# Patient Record
Sex: MALE | Race: WHITE | Employment: OTHER | URBAN - METROPOLITAN AREA
[De-identification: names, ages, dates, MRNs, and addresses within clinical notes are randomized per-mention and may not be internally consistent; named-entity substitution may affect disease eponyms.]

---

## 2017-09-26 ENCOUNTER — TRANSFERRED RECORDS (OUTPATIENT)
Dept: HEALTH INFORMATION MANAGEMENT | Facility: CLINIC | Age: 82
End: 2017-09-26

## 2018-08-14 ENCOUNTER — TRANSFERRED RECORDS (OUTPATIENT)
Dept: HEALTH INFORMATION MANAGEMENT | Facility: CLINIC | Age: 83
End: 2018-08-14

## 2018-11-28 ENCOUNTER — HOSPITAL ENCOUNTER (EMERGENCY)
Facility: CLINIC | Age: 83
Discharge: HOME | End: 2018-11-28
Attending: EMERGENCY MEDICINE | Admitting: EMERGENCY MEDICINE
Payer: COMMERCIAL

## 2018-11-28 ENCOUNTER — APPOINTMENT (OUTPATIENT)
Dept: GENERAL RADIOLOGY | Facility: CLINIC | Age: 83
End: 2018-11-28
Attending: EMERGENCY MEDICINE
Payer: COMMERCIAL

## 2018-11-28 VITALS — TEMPERATURE: 97.7 F | OXYGEN SATURATION: 96 % | DIASTOLIC BLOOD PRESSURE: 84 MMHG | SYSTOLIC BLOOD PRESSURE: 163 MMHG

## 2018-11-28 DIAGNOSIS — I10 ASYMPTOMATIC HYPERTENSION: ICD-10-CM

## 2018-11-28 LAB
ANION GAP SERPL CALCULATED.3IONS-SCNC: 5 MMOL/L (ref 3–14)
BASOPHILS # BLD AUTO: 0 10E9/L (ref 0–0.2)
BASOPHILS NFR BLD AUTO: 0 %
BUN SERPL-MCNC: 15 MG/DL (ref 7–30)
CALCIUM SERPL-MCNC: 8.7 MG/DL (ref 8.5–10.1)
CHLORIDE SERPL-SCNC: 107 MMOL/L (ref 94–109)
CO2 SERPL-SCNC: 30 MMOL/L (ref 20–32)
CREAT SERPL-MCNC: 1.08 MG/DL (ref 0.66–1.25)
DIFFERENTIAL METHOD BLD: ABNORMAL
EOSINOPHIL # BLD AUTO: 0 10E9/L (ref 0–0.7)
EOSINOPHIL NFR BLD AUTO: 0 %
ERYTHROCYTE [DISTWIDTH] IN BLOOD BY AUTOMATED COUNT: 15.4 % (ref 10–15)
GFR SERPL CREATININE-BSD FRML MDRD: 64 ML/MIN/1.7M2
GLUCOSE SERPL-MCNC: 137 MG/DL (ref 70–99)
HCT VFR BLD AUTO: 47.2 % (ref 40–53)
HGB BLD-MCNC: 14.8 G/DL (ref 13.3–17.7)
LYMPHOCYTES # BLD AUTO: 2.1 10E9/L (ref 0.8–5.3)
LYMPHOCYTES NFR BLD AUTO: 16 %
MCH RBC QN AUTO: 29.7 PG (ref 26.5–33)
MCHC RBC AUTO-ENTMCNC: 31.4 G/DL (ref 31.5–36.5)
MCV RBC AUTO: 95 FL (ref 78–100)
MONOCYTES # BLD AUTO: 0.4 10E9/L (ref 0–1.3)
MONOCYTES NFR BLD AUTO: 3 %
NEUTROPHILS # BLD AUTO: 10.7 10E9/L (ref 1.6–8.3)
NEUTROPHILS NFR BLD AUTO: 81 %
NT-PROBNP SERPL-MCNC: 256 PG/ML (ref 0–1800)
PLATELET # BLD AUTO: 454 10E9/L (ref 150–450)
PLATELET # BLD EST: ABNORMAL 10*3/UL
POTASSIUM SERPL-SCNC: 4.1 MMOL/L (ref 3.4–5.3)
RBC # BLD AUTO: 4.99 10E12/L (ref 4.4–5.9)
RBC MORPH BLD: ABNORMAL
SODIUM SERPL-SCNC: 142 MMOL/L (ref 133–144)
TROPONIN I SERPL-MCNC: <0.015 UG/L (ref 0–0.04)
VARIANT LYMPHS BLD QL SMEAR: PRESENT
WBC # BLD AUTO: 13.2 10E9/L (ref 4–11)

## 2018-11-28 PROCEDURE — 36415 COLL VENOUS BLD VENIPUNCTURE: CPT | Performed by: EMERGENCY MEDICINE

## 2018-11-28 PROCEDURE — 85025 COMPLETE CBC W/AUTO DIFF WBC: CPT | Performed by: EMERGENCY MEDICINE

## 2018-11-28 PROCEDURE — 71046 X-RAY EXAM CHEST 2 VIEWS: CPT

## 2018-11-28 PROCEDURE — 83880 ASSAY OF NATRIURETIC PEPTIDE: CPT | Performed by: EMERGENCY MEDICINE

## 2018-11-28 PROCEDURE — 84484 ASSAY OF TROPONIN QUANT: CPT | Performed by: EMERGENCY MEDICINE

## 2018-11-28 PROCEDURE — 93005 ELECTROCARDIOGRAM TRACING: CPT

## 2018-11-28 PROCEDURE — 80048 BASIC METABOLIC PNL TOTAL CA: CPT | Performed by: EMERGENCY MEDICINE

## 2018-11-28 PROCEDURE — 99285 EMERGENCY DEPT VISIT HI MDM: CPT | Mod: 25

## 2018-11-28 ASSESSMENT — ENCOUNTER SYMPTOMS
DIARRHEA: 0
NAUSEA: 0
VOMITING: 0
SHORTNESS OF BREATH: 0

## 2018-11-28 NOTE — ED TRIAGE NOTES
Pt presents to ED via EMS after a routine check-up from his home health nurse who noted his heart rate 100, /100.  Nurse then called on-call physician who requested pt to be seen here.  Pt is asymptomatic.  ABCs intact, alert and orientated.

## 2018-11-28 NOTE — ED NOTES
Bed: ED22  Expected date: 11/28/18  Expected time: 5:00 PM  Means of arrival: Ambulance  Comments:  OFE

## 2018-11-28 NOTE — ED AVS SNAPSHOT
[facility] Emergency Department    [address] [place] [address]    Phone:  [phone]    Fax:  [phone]                                       Joel Brooks   MRN: [id number]    Department:  [facility] Emergency Department   Date of Visit:  11/28/2018           Patient Information     Date Of Birth          8/14/1926        Your diagnoses for this visit were:     Asymptomatic hypertension        You were seen by Kandy Thompson MD.      Follow-up Information     Follow up with [facility] [place]. Schedule an appointment as soon as possible for a visit in 2 days.    Contact information:    [facility]  [phone]          Discharge Instructions       Please have your PCP follow up with your in the next 2-3 days for repeat blood pressure check.  If you have chest pain, shortness of breath, increased swelling, difficulty doing your normal activities or other concerns return to the ED sooner.      Discharge Instructions  Hypertension - High Blood Pressure    During you visit to the Emergency Department, your blood pressure was higher than the recommended blood pressure.  This may be related to stress, pain, medication or other temporary conditions. In these cases, your blood pressure may return to normal on its own. If you have a history of high blood pressure, you may need to have your provider adjust your medications. Sometimes, your high measurement here may indicate that you have developed high blood pressure that will stay high unless it is treated. As a general rule, high blood pressure causes problems over years rather than days, weeks, or months. So, while it is important to treat blood pressure, it is rarely important to treat blood pressure immediately. Occasionally we will begin a medication in the Emergency Department; more often we will recommend close follow-up for medications with a primary doctor/clinic.    Generally, every Emergency  Department visit should have a follow-up clinic visit with either a primary or a specialty clinic/provider. Please follow-up as instructed by your emergency provider today.    Return to the Emergency Department if you start to have:    A severe headache.    Chest pain.    Shortness of breath.    Weakness or numbness that affects one part of the body.    Confusion.    Vision changes.    Significant swelling of legs and/or eyes.    A reaction to any medication started in the Emergency Department.    What can I do to help myself?    Avoid alcohol.    Take any blood pressure medicine that you are prescribed.    Get a good night s sleep.    Lower your salt intake.    Exercise.    Lose weight.    Manage stress.    See your doctor regularly    If blood pressure medication was started in the Emergency Department:    The medicine may not have an immediate effect. The body and brain determine what blood pressure you have. The medicine s job is to retrain the body s  thermostat  to a lower blood pressure.    You will need to follow up with your provider to see how this medicine is working for you.  If you were given a prescription for medicine here today, be sure to read all of the information (including the package insert) that comes with your prescription.  This will include important information about the medicine, its side effects, and any warnings that you need to know about.  The pharmacist who fills the prescription can provide more information and answer questions you may have about the medicine.  If you have questions or concerns that the pharmacist cannot address, please call or return to the Emergency Department.   Remember that you can always come back to the Emergency Department if you are not able to see your regular provider in the amount of time listed above, if you get any new symptoms, or if there is anything that worries you.      24 Hour Appointment Hotline       To make an appointment at any [facility]  clinic, call [phone] ([phone]). If you don't have a family doctor or clinic, we will help you find one. [facility] are conveniently located to serve the needs of you and your family.             Review of your medicines      Our records show that you are taking the medicines listed below. If these are incorrect, please call your family doctor or clinic.        Dose / Directions Last dose taken    aspirin 81 MG tablet   Commonly known as:  ASA        1 TABLET DAILY   Refills:  0        lisinopril-hydrochlorothiazide 10-12.5 MG per tablet   Commonly known as:  PRINZIDE/ZESTORETIC   Dose:  1 tablet        Take 1 tablet by mouth   Refills:  0        lovastatin 20 MG tablet   Commonly known as:  MEVACOR   Dose:  1 tablet        Take 1 tablet by mouth   Refills:  0        PREDNISONE PO        Refills:  0                Procedures and tests performed during your visit     Basic metabolic panel    CBC with platelets differential    EKG 12-lead, tracing only    Nt probnp inpatient (BNP)    Troponin I    XR Chest 2 Views      Orders Needing Specimen Collection     None      Pending Results     Date and Time Order Name Status Description    11/28/2018 1847 XR Chest 2 Views Preliminary     11/28/2018 1847 EKG 12-lead, tracing only Preliminary             Pending Culture Results     No orders found from 11/26/2018 to 11/29/2018.            Pending Results Instructions     If you had any lab results that were not finalized at the time of your Discharge, you can call the ED Lab Result RN at [phone]. You will be contacted by this team for any positive Lab results or changes in treatment. The nurses are available 7 days a week from 10A to 6:30P.  You can leave a message 24 hours per day and they will return your call.        Test Results From Your Hospital Stay        11/28/2018  7:36 PM      Narrative     XR CHEST 2 VW   11/28/2018 7:29 PM     HISTORY: chest congestion;     COMPARISON: None.    FINDINGS:  The heart is negative.  The lungs are clear. There is a  large gas-filled structure in the retrocardiac region. This is  consistent with a large hiatal hernia. A loop of bowel appears to  extend into the hernia sac..  Healed left rib fractures are noted..        Impression     IMPRESSION:   1. No active alveolar-type infiltrates are identified.  2. Large hiatal hernia.             11/28/2018  7:49 PM      Component Results     Component Value Ref Range & Units Status    Sodium 142 133 - 144 mmol/L Final    Potassium 4.1 3.4 - 5.3 mmol/L Final    Chloride 107 94 - 109 mmol/L Final    Carbon Dioxide 30 20 - 32 mmol/L Final    Anion Gap 5 3 - 14 mmol/L Final    Glucose 137 (H) 70 - 99 mg/dL Final    Urea Nitrogen 15 7 - 30 mg/dL Final    Creatinine 1.08 0.66 - 1.25 mg/dL Final    GFR Estimate 64 >60 mL/min/1.7m2 Final    Non  GFR Calc    GFR Estimate If Black 77 >60 mL/min/1.7m2 Final    African American GFR Calc    Calcium 8.7 8.5 - 10.1 mg/dL Final         11/28/2018  8:02 PM      Component Results     Component Value Ref Range & Units Status    WBC 13.2 (H) 4.0 - 11.0 10e9/L Final    RBC Count 4.99 4.4 - 5.9 10e12/L Final    Hemoglobin 14.8 13.3 - 17.7 g/dL Final    Hematocrit 47.2 40.0 - 53.0 % Final    MCV 95 78 - 100 fl Final    MCH 29.7 26.5 - 33.0 pg Final    MCHC 31.4 (L) 31.5 - 36.5 g/dL Final    RDW 15.4 (H) 10.0 - 15.0 % Final    Platelet Count 454 (H) 150 - 450 10e9/L Final    Diff Method Manual Differential  Final    % Neutrophils 81.0 % Final    % Lymphocytes 16.0 % Final    % Monocytes 3.0 % Final    % Eosinophils 0.0 % Final    % Basophils 0.0 % Final    Absolute Neutrophil 10.7 (H) 1.6 - 8.3 10e9/L Final    Absolute Lymphocytes 2.1 0.8 - 5.3 10e9/L Final    Absolute Monocytes 0.4 0.0 - 1.3 10e9/L Final    Absolute Eosinophils 0.0 0.0 - 0.7 10e9/L Final    Absolute Basophils 0.0 0.0 - 0.2 10e9/L Final    RBC Morphology   Final    Consistent with reported results    Platelet Estimate    Final    Automated count confirmed.  Platelet morphology is normal.    Reactive Lymphs Present  Final         11/28/2018  7:49 PM      Component Results     Component Value Ref Range & Units Status    N-Terminal Pro BNP Inpatient 256 0 - 1800 pg/mL Final       Reference range shown and results flagged as abnormal are suggested inpatient   cut points for confirming diagnosis if CHF in an acute setting. Establishing a   baseline value for each individual patient is useful for follow-up. An   inpatient or emergency department NT-proPBNP <300 pg/mL effectively rules out   acute CHF, with 99% negative predictive value.  The outpatient non-acute reference range for ruling out CHF is:   0-125 pg/mL (age 18 to less than 75)   0-450 pg/mL (age 75 yrs and older)           11/28/2018  7:49 PM      Component Results     Component Value Ref Range & Units Status    Troponin I ES <0.015 0.000 - 0.045 ug/L Final    The 99th percentile for upper reference range is 0.045 ug/L.  Troponin values   in the range of 0.045 - 0.120 ug/L may be associated with risks of adverse   clinical events.                  Clinical Quality Measure: Blood Pressure Screening     Your blood pressure was checked while you were in the emergency department today. The last reading we obtained was  BP: 163/84 . Please read the guidelines below about what these numbers mean and what you should do about them.  If your systolic blood pressure (the top number) is less than 120 and your diastolic blood pressure (the bottom number) is less than 80, then your blood pressure is normal. There is nothing more that you need to do about it.  If your systolic blood pressure (the top number) is 120-139 or your diastolic blood pressure (the bottom number) is 80-89, your blood pressure may be higher than it should be. You should have your blood pressure rechecked within a year by a primary care provider.  If your systolic blood pressure (the top number) is 140 or greater or  "your diastolic blood pressure (the bottom number) is 90 or greater, you may have high blood pressure. High blood pressure is treatable, but if left untreated over time it can put you at risk for heart attack, stroke, or kidney failure. You should have your blood pressure rechecked by a primary care provider within the next 4 weeks.  If your provider in the emergency department today gave you specific instructions to follow-up with your doctor or provider even sooner than that, you should follow that instruction and not wait for up to 4 weeks for your follow-up visit.        Thank you for choosing [facility]       Thank you for choosing [facility] for your care. Our goal is always to provide you with excellent care. Hearing back from our patients is one way we can continue to improve our services. Please take a few minutes to complete the written survey that you may receive in the mail after you visit with us. Thank you!        [employer] Information     [employer] lets you send messages to your doctor, view your test results, renew your prescriptions, schedule appointments and more. To sign up, go to [email or web address] . Click on \"Log in\" on the left side of the screen, which will take you to the Welcome page. Then click on \"Sign up Now\" on the right side of the page.     You will be asked to enter the access code listed below, as well as some personal information. Please follow the directions to create your username and password.     Your access code is: [email or web address]  Expires: 2019  8:19 PM     Your access code will  in 90 days. If you need help or a new code, please call your [facility] clinic or [phone].        Care EveryWhere ID     This is your Care EveryWhere ID. This could be used by other organizations to access your [facility] medical records  [id number]        Equal Access to Services     MANUEL LIZARRAGA : amanda Saldana, deanne garcia " lyssa joseph ah. So [facility] [phone].    ATENCIÓN: Si habla español, tiene a small disposición servicios gratuitos de asistencia lingüística. Llame al [phone].    We comply with applicable federal civil rights laws and [place] laws. We do not discriminate on the basis of race, color, national origin, age, disability, sex, sexual orientation, or gender identity.            After Visit Summary       This is your record. Keep this with you and show to your community pharmacist(s) and doctor(s) at your next visit.

## 2018-11-28 NOTE — ED PROVIDER NOTES
History     Chief Complaint:  Leg swelling    HPI   Joel Brooks is a 89 year old male, with a history of alzheimer's disease, HDL and HTN, who presents with his wife to the emergency department for evaluation of leg swelling. The patient's wife reports the patient's nurse came to their home this morning and was concerned about the patient's hypertension and high heart rate. She reports the nurse comes every other week, but returned this afternoon around 1530 because she was concerned about morning results. She reports ankle swelling that has come and go for a while but it worsened this morning. The patient denies any difficulty breathing, nausea, vomiting, diarrhea, or chest pain. The patient's daughter notes the patient is not very mobile due to arthritis and alzheimer's.    Allergies:  No known drug allergies     Medications:    Prednisone  81 mg Aspirin  Lisinopril-hydrochlorothiazide  Lovastatin  Pantoprazole     Past Medical History:    Gout  HDL  HTN  Anemia  Alzheimer's disease    Past Surgical History:    Left cataract IOL    Family History:    History reviewed. No pertinent family history.     Social History:  Smoking status: Former smoker  Alcohol use: Yes  The patient presents to the emergency department with his wife, daughter and son.  Marital Status:   [2]     Review of Systems   Respiratory: Negative for shortness of breath.    Cardiovascular: Positive for leg swelling. Negative for chest pain.   Gastrointestinal: Negative for diarrhea, nausea and vomiting.   All other systems reviewed and are negative.    Physical Exam   Patient Vitals for the past 24 hrs:   BP Temp Temp src Heart Rate SpO2   11/28/18 2000 163/84 - - - 96 %   11/28/18 1830 161/86 - - - 97 %   11/28/18 1800 157/84 - - - 94 %   11/28/18 1730 150/87 - - - 96 %   11/28/18 1726 165/87 - - - -   11/28/18 1724 - 97.7  F (36.5  C) Oral 83 96 %     Physical Exam  General: Resting on the bed.  Head: No obvious trauma to  head.  Ears, Nose, Throat:  External ears normal.  Nose normal.   Eyes:  Conjunctivae clear.    Neck: Normal range of motion.  Neck supple.   CV: Regular rate and rhythm.  No murmurs.      Respiratory: Effort normal and breath sounds normal.  No wheezing or crackles.   Gastrointestinal: Soft.  No distension. There is no tenderness.    Musculoskeletal: mild bilateral ankle edema, non tender  Skin: Skin is warm and dry.  No rash noted.     Emergency Department Course   ECG (18:58:44):  Rate 73 bpm. NE interval 196. QRS duration 68. QT/QTc 396/436. P-R-T axes 50 72 76. Normal sinus rhythm. Possible left atrial enlargement. Borderline ECG. No significant change when compared to EKG dated 9/10/07. Interpreted at 1719 by Kandy Thompson MD.    Imaging:  Radiographic findings were communicated with the patient and family who voiced understanding of the findings.    XR Chest 2 Views  IMPRESSION:   1. No active alveolar-type infiltrates are identified.  2. Large hiatal hernia.      As read by Radiology.    Laboratory:  CBC: WBC 13.2 (H),  (H) o/w WNL (HGB 14.8)  BMP: Glucose 137 (H) o/w WNL (Creatinine 1.08)    BNP: 256  Troponin I (): <0.015    Emergency Department Course:  Patient arrived via EMS.    Past medical records, nursing notes, and vitals reviewed.  : I performed an exam of the patient and obtained history, as documented above.     IV inserted and blood drawn.    The patient was sent for a chest x-ray while in the emergency department, findings above.    : I rechecked the patient. Explained findings to the patient and his family.    : I rechecked the patient. Findings and plan explained to the Patient and spouse, son and daughter. Patient discharged home with instructions regarding supportive care, medications, and reasons to return. The importance of close follow-up was reviewed.   Impression & Plan    Medical Decision Makin-year-old male with history of hypertension, rheumatoid  arthritis, on prednisone, presents with asymptomatic hypertension.  Patient's blood pressures are mildly hypertensive in the 150s/160s with diastolics in the 80s.  Otherwise unremarkable.  Broad differential pursued including but not limited to endorgan dysfunction, subarachnoid, stroke, electrolyte metabolic or renal dysfunction, acute coronary syndrome, CHF, pleural effusion, congestion, etc.  Patient has no symptoms.  He only had his blood pressure checked today because his home nurse comes to check on him biweekly.  He has had some mild ankle swelling which is been going on for months at this point.  He denies any sleep issues or chest congestion.  CBC shows mild leukocytosis, likely secondary to his chronic prednisone use.  No evidence of anemia.  BMP shows no acute electrolyte metabolic or renal dysfunction.  BNP is normal, not concerning for CHF.  Chest x-ray shows no evidence of congestion, effusions, pneumothorax, infiltrate.  EKG showed sinus rhythm no acute ST-T wave changes, troponin negative.  Patient had no chest pain or shortness of breath.  He had no symptoms at all.  Had discussion about possible admission versus outpatient management.  Given that patient has asymptomatic hypertension without any evidence of endorgan dysfunction or any symptoms, felt that home management was appropriate.  His blood pressures are only minimally elevated at this point, I do not feel the need to adjust his medications at this time.  He will follow-up with his primary doctor in the next several days.  Strict return precautions were discussed and he was discharged in stable improved condition.    Diagnosis:    ICD-10-CM   1. Asymptomatic hypertension I10     Disposition:  Discharged to home with his family.  Arben Ocampo  11/28/2018   [facility] EMERGENCY DEPARTMENT  Scribe Disclosure:  I, Arben Ocampo, am serving as a scribe at 6:34 PM on 11/28/2018 to document services personally performed by Jay  Kandy ROPER MD based on my observations and the provider's statements to me.      Kandy Thompson MD  11/29/18 0104       Kandy Thompson MD  11/29/18 0104

## 2018-11-28 NOTE — ED AVS SNAPSHOT
[facility] Emergency Department    [address]    [facility] [address]    Phone:  [phone]    Fax:  [phone]                                       Joel Brooks   MRN: [id number]    Department:  [facility] Emergency Department   Date of Visit:  11/28/2018           After Visit Summary Signature Page     I have received my discharge instructions, and my questions have been answered. I have discussed any challenges I see with this plan with the nurse or doctor.    ..........................................................................................................................................  Patient/Patient Representative Signature      ..........................................................................................................................................  Patient Representative Print Name and Relationship to Patient    ..................................................               ................................................  Date                                   Time    ..........................................................................................................................................  Reviewed by Signature/Title    ...................................................              ..............................................  Date                                               Time          22EPIC Rev 08/18

## 2018-11-29 LAB — INTERPRETATION ECG - MUSE: NORMAL

## 2018-11-29 NOTE — ED NOTES
Applied monitoring equipment onto Patient (Pulse ox and BP). Call light is within Pt. Reach. Pt. Denies any other needs at this time.

## 2018-11-29 NOTE — DISCHARGE INSTRUCTIONS
Please have your PCP follow up with your in the next 2-3 days for repeat blood pressure check.  If you have chest pain, shortness of breath, increased swelling, difficulty doing your normal activities or other concerns return to the ED sooner.      Discharge Instructions  Hypertension - High Blood Pressure    During you visit to the Emergency Department, your blood pressure was higher than the recommended blood pressure.  This may be related to stress, pain, medication or other temporary conditions. In these cases, your blood pressure may return to normal on its own. If you have a history of high blood pressure, you may need to have your provider adjust your medications. Sometimes, your high measurement here may indicate that you have developed high blood pressure that will stay high unless it is treated. As a general rule, high blood pressure causes problems over years rather than days, weeks, or months. So, while it is important to treat blood pressure, it is rarely important to treat blood pressure immediately. Occasionally we will begin a medication in the Emergency Department; more often we will recommend close follow-up for medications with a primary doctor/clinic.    Generally, every Emergency Department visit should have a follow-up clinic visit with either a primary or a specialty clinic/provider. Please follow-up as instructed by your emergency provider today.    Return to the Emergency Department if you start to have:    A severe headache.    Chest pain.    Shortness of breath.    Weakness or numbness that affects one part of the body.    Confusion.    Vision changes.    Significant swelling of legs and/or eyes.    A reaction to any medication started in the Emergency Department.    What can I do to help myself?    Avoid alcohol.    Take any blood pressure medicine that you are prescribed.    Get a good night s sleep.    Lower your salt intake.    Exercise.    Lose weight.    Manage stress.    See your  doctor regularly    If blood pressure medication was started in the Emergency Department:    The medicine may not have an immediate effect. The body and brain determine what blood pressure you have. The medicine s job is to retrain the body s  thermostat  to a lower blood pressure.    You will need to follow up with your provider to see how this medicine is working for you.  If you were given a prescription for medicine here today, be sure to read all of the information (including the package insert) that comes with your prescription.  This will include important information about the medicine, its side effects, and any warnings that you need to know about.  The pharmacist who fills the prescription can provide more information and answer questions you may have about the medicine.  If you have questions or concerns that the pharmacist cannot address, please call or return to the Emergency Department.   Remember that you can always come back to the Emergency Department if you are not able to see your regular provider in the amount of time listed above, if you get any new symptoms, or if there is anything that worries you.

## 2020-01-01 ENCOUNTER — DOCUMENTATION ONLY (OUTPATIENT)
Dept: OTHER | Facility: CLINIC | Age: 85
End: 2020-01-01